# Patient Record
Sex: FEMALE | Race: WHITE | URBAN - METROPOLITAN AREA
[De-identification: names, ages, dates, MRNs, and addresses within clinical notes are randomized per-mention and may not be internally consistent; named-entity substitution may affect disease eponyms.]

---

## 2021-05-18 ENCOUNTER — NURSING HOME VISIT (OUTPATIENT)
Dept: GERIATRICS | Facility: CLINIC | Age: 81
End: 2021-05-18
Payer: COMMERCIAL

## 2021-05-18 ENCOUNTER — HOSPITAL LABORATORY (OUTPATIENT)
Dept: NURSING HOME | Facility: OTHER | Age: 81
End: 2021-05-18

## 2021-05-18 VITALS
WEIGHT: 132 LBS | SYSTOLIC BLOOD PRESSURE: 140 MMHG | TEMPERATURE: 98.7 F | OXYGEN SATURATION: 91 % | DIASTOLIC BLOOD PRESSURE: 70 MMHG | HEART RATE: 88 BPM | BODY MASS INDEX: 23.38 KG/M2

## 2021-05-18 DIAGNOSIS — Z71.89 ACP (ADVANCE CARE PLANNING): ICD-10-CM

## 2021-05-18 DIAGNOSIS — F03.91 DEMENTIA WITH BEHAVIORAL DISTURBANCE, UNSPECIFIED DEMENTIA TYPE: ICD-10-CM

## 2021-05-18 DIAGNOSIS — I82.622 ACUTE DEEP VEIN THROMBOSIS (DVT) OF LEFT UPPER EXTREMITY, UNSPECIFIED VEIN (H): ICD-10-CM

## 2021-05-18 DIAGNOSIS — F22 PARANOID BEHAVIOR (H): ICD-10-CM

## 2021-05-18 DIAGNOSIS — R62.7 ADULT FAILURE TO THRIVE: ICD-10-CM

## 2021-05-18 DIAGNOSIS — Z96.649 S/P HIP HEMIARTHROPLASTY: ICD-10-CM

## 2021-05-18 DIAGNOSIS — E11.9 TYPE 2 DIABETES, HBA1C GOAL < 8% (H): ICD-10-CM

## 2021-05-18 DIAGNOSIS — Z47.89 ORTHOPEDIC AFTERCARE: Primary | ICD-10-CM

## 2021-05-18 PROCEDURE — 99310 SBSQ NF CARE HIGH MDM 45: CPT | Performed by: NURSE PRACTITIONER

## 2021-05-18 RX ORDER — SENNOSIDES A AND B 8.6 MG/1
1 TABLET, FILM COATED ORAL 2 TIMES DAILY
COMMUNITY
End: 2021-05-25

## 2021-05-18 RX ORDER — ACETAMINOPHEN 500 MG
1000 TABLET ORAL 3 TIMES DAILY
COMMUNITY

## 2021-05-18 RX ORDER — MORPHINE SULFATE 100 MG/5ML
5 SOLUTION ORAL EVERY 4 HOURS PRN
COMMUNITY
End: 2021-05-27

## 2021-05-18 NOTE — PROGRESS NOTES
[facility]  PRIMARY CARE PROVIDER AND CLINIC:  Chantelle Villalobos MD, [address] [place] [address] [id number] / [place] [address]  Chief Complaint   Patient presents with     Hospital F/U     [facility] Medical Record Number:  [id number]  Place of Service where encounter took place:  [facility]    HPI:    Ryanne Samuels  is a 81 year old  (1940), admitted to the above facility from  [facility]. Hospital stay 5/3 through 5/14..  Admitted to this facility for  Nursing care - and rehab if she improves or hospice if she declines.   Brief Summary of Hospital Course: Ryanne had a mechanical fall - s/p L hemiarthoplasty on 5/3 with subsequent delirium/dementia and FTT appearance.   Other complications: DVT r/t PICC - now on eliquis and on imaginig noted some abnormalities of left hepatic lobe, could represent cyst vs. Hemangioma,   Updates on Status Since Skilled nursing Admission: pt is not eating/drinking much -   Today c/o feeling uncomfortable with her pants on.     CODE STATUS/ADVANCE DIRECTIVES DISCUSSION:   DNR / DNI  Patient[place] living condition: lived in [place], but now Skilled Nursing Facility.   ALLERGIES: No known drug allergies and Penicillins  PAST MEDICAL HISTORY:  has a past medical history of Acute cholecystitis, Iron deficiency anemia, unspecified, Pyelonephritis, unspecified, and Type II or unspecified type diabetes mellitus without mention of complication, not stated as uncontrolled.  PAST SURGICAL HISTORY:   has a past surgical history that includes surgical history of - .  FAMILY HISTORY: family history includes Alcohol/Drug in her sister; Diabetes in her mother and sister.  SOCIAL HISTORY:   reports that she quit smoking about 36 years ago. She has never used smokeless tobacco. She reports that she does not drink alcohol or use drugs.    Post Discharge Medication Reconciliation Status: discharge medications reconciled, continue medications without change    Current Outpatient Medications    Medication Sig Dispense Refill     acetaminophen (TYLENOL) 500 MG tablet Take 1,000 mg by mouth 3 times daily       apixaban ANTICOAGULANT (ELIQUIS) 5 MG tablet Take 5 mg by mouth 2 times daily       morphine sulfate, high concentrate, (ROXANOL-CONCENTRATED) 20 MG/ML concentrated solution Take 5 mg by mouth every 4 hours as needed       senna (SENOKOT) 8.6 MG tablet Take 1 tablet by mouth 2 times daily       ASPIRIN 81 MG OR TABS 1 TABLET DAILY       divalproex (DEPAKOTE SPRINKLE) 125 MG capsule Take 1 capsule by mouth 2 times daily. 180 capsule 0     mirtazapine (REMERON) 15 MG tablet Take 1 tablet by mouth At Bedtime. 90 tablet 0     Skin Protectants, Misc. (EUCERIN) cream Apply  topically as needed for dry skin. 454 g 0       ROS:  Limited secondary to cognitive impairment but today pt reports no pain, but just not right.     Vitals:  BP (!) 140/70   Pulse 88   Temp 98.7  F (37.1  C)   Wt 59.9 kg (132 lb)   SpO2 91%   BMI 23.38 kg/m    Exam:  GENERAL APPEARANCE:  Alert, in no distress  RESP:  respiratory effort and palpation of chest normal, auscultation of lungs diminished but clear , no respiratory distress  CV:  Palpation and auscultation of heart done , rate and rhythm reg, no murmur, no peripheral edema  ABDOMEN:  normal bowel sounds, soft, nontender, no hepatosplenomegaly or other masses  M/S:   Gait and station WBAT, but w/c/bed ridden most of the time given FTT, Digits and nails intact  SKIN:  Inspection and Palpation of skin and subcutaneous tissue healing L hip incision - eccymosis on L arm o/w intact  NEURO: 2-12 grossly intact  PSYCH:  insight and judgement, memory poor - baseline , affect and mood Pleasant     Lab/Diagnostic data:  Recent labs in [facility] reviewed by me today.          ASSESSMENT/PLAN:  Orthopedic aftercare and S/P hip hemiarthroplasty  Will scheduled Tylenol - cont eliquis for DVT prevention (treatment of actual DVT)    Acute deep vein thrombosis (DVT) of left upper extremity,  unspecified vein (H)  Cont eliquis but hold aSA x 2 days given elevated dose.     Adult failure to thrive and Dementia with behavioral disturbance, unspecified dementia type (H) and ACP (advance care planning) and Paranoid behavior (H)  Goals now are more palliative, but encouraging - will encourage pt to eat/drink, and see how she does - if improves - will trial PT/OT  If declines, will add more supportive cares. - discussed with dgt.     Type 2 diabetes  Not eating much - will hold DMII meds and check accuchecks - goal ~ 100 - 250.     Orders    DNR/DNI    discontinue amaryl, cranberry tab, donepezil, vit D, lipitor, lasix, Januvia, imodium    Hold ASA 5/19 and 5/20    Tylenol 1000 mg po TID     Change senna to 1 tab po BID - hold if loose stools (from 2 am)    Called and spoke with dgt - agrre with POC  - if she remains calm - can trial decrease in depakote next visit.     Total time spent with patient visit at the skilled nursing facility was 40 min including patient visit, review of past records and phone call to patient contact. Greater than 50% of total time spent with counseling and coordinating care due to medication changes as above - plans of care/goals of care .     Electronically signed by:  NICOLAS Ogden CNP

## 2021-05-18 NOTE — LETTER
5/18/2021        RE: Ryanne Samuels  [address]  [place] [address]        [facility]  PRIMARY CARE PROVIDER AND CLINIC:  Chantelle Villalobos MD, [address] [place] [address] / [place] [address]  Chief Complaint   Patient presents with     Hospital F/U     [facility] Medical Record Number:  [id number]  Place of Service where encounter took place:  [facility]    HPI:    Ryanne Samuels  is a 81 year old  (1940), admitted to the above facility from  [facility]. Hospital stay 5/3 through 5/14..  Admitted to this facility for  Nursing care - and rehab if she improves or hospice if she declines.   Brief Summary of Hospital Course: Ryanne had a mechanical fall - s/p L hemiarthoplasty on 5/3 with subsequent delirium/dementia and FTT appearance.   Other complications: DVT r/t PICC - now on eliquis and on imaginig noted some abnormalities of left hepatic lobe, could represent cyst vs. Hemangioma,   Updates on Status Since Skilled nursing Admission: pt is not eating/drinking much -   Today c/o feeling uncomfortable with her pants on.     CODE STATUS/ADVANCE DIRECTIVES DISCUSSION:   DNR / DNI  Patient's living condition: lived in [place], but now Skilled Nursing Facility.   ALLERGIES: No known drug allergies and Penicillins  PAST MEDICAL HISTORY:  has a past medical history of Acute cholecystitis, Iron deficiency anemia, unspecified, Pyelonephritis, unspecified, and Type II or unspecified type diabetes mellitus without mention of complication, not stated as uncontrolled.  PAST SURGICAL HISTORY:   has a past surgical history that includes surgical history of - .  FAMILY HISTORY: family history includes Alcohol/Drug in her sister; Diabetes in her mother and sister.  SOCIAL HISTORY:   reports that she quit smoking about 36 years ago. She has never used smokeless tobacco. She reports that she does not drink alcohol or use drugs.    Post Discharge Medication Reconciliation Status: discharge medications  reconciled, continue medications without change    Current Outpatient Medications   Medication Sig Dispense Refill     acetaminophen (TYLENOL) 500 MG tablet Take 1,000 mg by mouth 3 times daily       apixaban ANTICOAGULANT (ELIQUIS) 5 MG tablet Take 5 mg by mouth 2 times daily       morphine sulfate, high concentrate, (ROXANOL-CONCENTRATED) 20 MG/ML concentrated solution Take 5 mg by mouth every 4 hours as needed       senna (SENOKOT) 8.6 MG tablet Take 1 tablet by mouth 2 times daily       ASPIRIN 81 MG OR TABS 1 TABLET DAILY       divalproex (DEPAKOTE SPRINKLE) 125 MG capsule Take 1 capsule by mouth 2 times daily. 180 capsule 0     mirtazapine (REMERON) 15 MG tablet Take 1 tablet by mouth At Bedtime. 90 tablet 0     Skin Protectants, Misc. (EUCERIN) cream Apply  topically as needed for dry skin. 454 g 0       ROS:  Limited secondary to cognitive impairment but today pt reports no pain, but just not right.     Vitals:  BP (!) 140/70   Pulse 88   Temp 98.7  F (37.1  C)   Wt 59.9 kg (132 lb)   SpO2 91%   BMI 23.38 kg/m    Exam:  GENERAL APPEARANCE:  Alert, in no distress  RESP:  respiratory effort and palpation of chest normal, auscultation of lungs diminished but clear , no respiratory distress  CV:  Palpation and auscultation of heart done , rate and rhythm reg, no murmur, no peripheral edema  ABDOMEN:  normal bowel sounds, soft, nontender, no hepatosplenomegaly or other masses  M/S:   Gait and station WBAT, but w/c/bed ridden most of the time given FTT, Digits and nails intact  SKIN:  Inspection and Palpation of skin and subcutaneous tissue healing L hip incision - eccymosis on L arm o/w intact  NEURO: 2-12 grossly intact  PSYCH:  insight and judgement, memory poor - baseline , affect and mood Pleasant     Lab/Diagnostic data:  Recent labs in [facility] reviewed by me today.          ASSESSMENT/PLAN:  Orthopedic aftercare and S/P hip hemiarthroplasty  Will scheduled Tylenol - cont eliquis for DVT prevention  (treatment of actual DVT)    Acute deep vein thrombosis (DVT) of left upper extremity, unspecified vein (H)  Cont eliquis but hold aSA x 2 days given elevated dose.     Adult failure to thrive and Dementia with behavioral disturbance, unspecified dementia type (H) and ACP (advance care planning) and Paranoid behavior (H)  Goals now are more palliative, but encouraging - will encourage pt to eat/drink, and see how she does - if improves - will trial PT/OT  If declines, will add more supportive cares. - discussed with dgt.     Type 2 diabetes  Not eating much - will hold DMII meds and check accuchecks - goal ~ 100 - 250.     Orders    DNR/DNI    discontinue amaryl, cranberry tab, donepezil, vit D, lipitor, lasix, Januvia, imodium    Hold ASA 5/19 and 5/20    Tylenol 1000 mg po TID     Change senna to 1 tab po BID - hold if loose stools (from 2 am)    Called and spoke with dgt - agrre with POC  - if she remains calm - can trial decrease in depakote next visit.     Total time spent with patient visit at the skilled nursing facility was 40 min including patient visit, review of past records and phone call to patient contact. Greater than 50% of total time spent with counseling and coordinating care due to medication changes as above - plans of care/goals of care .     Electronically signed by:  NICOLAS Ogden CNP                         Sincerely,        NICOLAS Ogden CNP

## 2021-05-19 LAB
GAMMA INTERFERON BACKGROUND BLD IA-ACNC: 0.05 IU/ML
M TB IFN-G CD4+ BCKGRND COR BLD-ACNC: 9.95 IU/ML
M TB TUBERC IFN-G BLD QL: NEGATIVE
MITOGEN IGNF BCKGRD COR BLD-ACNC: 0.2 IU/ML
MITOGEN IGNF BCKGRD COR BLD-ACNC: 0.24 IU/ML

## 2021-05-20 ENCOUNTER — TELEPHONE (OUTPATIENT)
Dept: GERIATRICS | Facility: CLINIC | Age: 81
End: 2021-05-20

## 2021-05-20 DIAGNOSIS — F03.91 DEMENTIA WITH BEHAVIORAL DISTURBANCE, UNSPECIFIED DEMENTIA TYPE: Primary | ICD-10-CM

## 2021-05-20 RX ORDER — GABAPENTIN 250 MG/5ML
125 SOLUTION ORAL 3 TIMES DAILY PRN
Qty: 470 ML | Refills: 0
Start: 2021-05-20

## 2021-05-20 NOTE — TELEPHONE ENCOUNTER
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   Electronically signed by:   NICOLAS Ogden CNP

## 2021-05-25 ENCOUNTER — NURSING HOME VISIT (OUTPATIENT)
Dept: GERIATRICS | Facility: CLINIC | Age: 81
End: 2021-05-25
Payer: COMMERCIAL

## 2021-05-25 VITALS
OXYGEN SATURATION: 92 % | TEMPERATURE: 98 F | BODY MASS INDEX: 23.38 KG/M2 | WEIGHT: 132 LBS | HEART RATE: 84 BPM | SYSTOLIC BLOOD PRESSURE: 90 MMHG | RESPIRATION RATE: 16 BRPM | DIASTOLIC BLOOD PRESSURE: 73 MMHG

## 2021-05-25 DIAGNOSIS — R62.7 ADULT FAILURE TO THRIVE: ICD-10-CM

## 2021-05-25 DIAGNOSIS — Z96.649 S/P HIP HEMIARTHROPLASTY: ICD-10-CM

## 2021-05-25 DIAGNOSIS — F03.91 DEMENTIA WITH BEHAVIORAL DISTURBANCE, UNSPECIFIED DEMENTIA TYPE: Primary | ICD-10-CM

## 2021-05-25 DIAGNOSIS — Z47.89 ORTHOPEDIC AFTERCARE: ICD-10-CM

## 2021-05-25 DIAGNOSIS — I82.622 ACUTE DEEP VEIN THROMBOSIS (DVT) OF LEFT UPPER EXTREMITY, UNSPECIFIED VEIN (H): ICD-10-CM

## 2021-05-25 DIAGNOSIS — E11.9 TYPE 2 DIABETES, HBA1C GOAL < 8% (H): ICD-10-CM

## 2021-05-25 PROCEDURE — 99309 SBSQ NF CARE MODERATE MDM 30: CPT | Performed by: NURSE PRACTITIONER

## 2021-05-25 RX ORDER — LORAZEPAM 2 MG/ML
1 CONCENTRATE ORAL EVERY 6 HOURS PRN
Qty: 30 ML | Refills: 0 | Status: SHIPPED | OUTPATIENT
Start: 2021-05-25

## 2021-05-25 NOTE — LETTER
5/25/2021        RE: Ryanne Samuels  [address]  [place] [address]        [facility]    Chief Complaint   Patient presents with     Nursing Home Acute     HPI:    Ryanne Samuels is a 81 year old  (1940), who is being seen today for an episodic care visit at: [facility] () [[id number]]  Today's concern is: yelling out - waking other residents at night - not sleeping - won't take any pills- will take morphine with minimal impact.    Ryanne is a new LTC resident s/p [facility] 5/3 - 5/14 after a mechanical fall - s/p L hemiarthoplasty on 5/3 with subsequent delirium/dementia and FTT appearance.   Other complications: DVT r/t PICC - now on eliquis and on imaginig noted some abnormalities of left hepatic lobe, could represent cyst vs. Hemangioma,   Since Skilled Nursing Facility admission:  - continues FTT and not eating/drinking much - not taking medications. Goals of care are comfort but supportive - Dgt wanted to give her the best options to see if she would perk up.   Medication changes:  5/18: discontinue amaryl, cranberry tab, donepezil, vit D, lipitor, lasix, Januvia, imodium, scheduled Tylenol,   5/20 - FELIX PRN for anxiety - but pt not willing to take it.     She was awake all night last night,   Now sleeping well and therefore I didn't wake her up.     PMH/PSH reviewed in [facility] today.  REVIEW OF SYSTEMS:  Unobtainable secondary to cognitive impairment.     EXAM:  BP 90/73   Pulse 84   Temp 98  F (36.7  C)   Resp 16   Wt 59.9 kg (132 lb)   SpO2 92%   BMI 23.38 kg/m    GENERAL APPEARANCE: resting comfortable  nonlabored breathing,   No edema     No labs given goals of care.     Assessment/Plan:     Dementia with behavioral disturbance, unspecified dementia type (H)  Adult failure to thrive  Type 2 diabetes, HbA1C goal < 8% (H)  Orthopedic aftercare  S/P hip hemiarthroplasty  Acute deep vein thrombosis (DVT) of left upper extremity, unspecified vein (H)     Pt  uncomfortable - yelling out - talked to nsg - pt unwilling/able to take pills/meds to help (even liquid FELIX)   - will try concentrate ativan and topical ABH cream    Noted accuchecks stable - can stop       Orders:    discontinue ASA, Eliquis, depakote sprinkles, senna and accuchecks    Start ativan 2 mg /ml give 0.5 mg po QID PRN x 14 d.     Start ABH cream 0.25 mg = 1  Click - give 1 click transderm q 1 hr prn x 14 d  o Ativan 1 mg/ml  o Benadryl 25 mg/ml  o Haldol 1 mg/ml      Talked to dgt Mickie and she agrees with plan of care   Isn't ready to go complete hospice yet - but goals of care are comfort.   Talked to pharm D to coordinate ABH cream.   Talked to nsg to coordinate medications  Total time 30 min with > 50% coordinate care with above.    Electronically signed by:  NICOLAS Ogden CNP            Sincerely,        NICOLAS Ogden CNP

## 2021-05-25 NOTE — PROGRESS NOTES
[facility]    Chief Complaint   Patient presents with     Nursing Home Acute     HPI:    Ryanne Samuels is a 81 year old  (1940), who is being seen today for an episodic care visit at: [facility] () [[id number]]  Today's concern is: yelling out - waking other residents at night - not sleeping - won't take any pills- will take morphine with minimal impact.    Ryanne is a new LTC resident s/p [facility] 5/3 - 5/14 after a mechanical fall - s/p L hemiarthoplasty on 5/3 with subsequent delirium/dementia and FTT appearance.   Other complications: DVT r/t PICC - now on eliquis and on imaginig noted some abnormalities of left hepatic lobe, could represent cyst vs. Hemangioma,   Since Skilled Nursing Facility admission:  - continues FTT and not eating/drinking much - not taking medications. Goals of care are comfort but supportive - Dgt wanted to give her the best options to see if she would perk up.   Medication changes:  5/18: discontinue amaryl, cranberry tab, donepezil, vit D, lipitor, lasix, Januvia, imodium, scheduled Tylenol,   5/20 - FELIX PRN for anxiety - but pt not willing to take it.     She was awake all night last night,   Now sleeping well and therefore I didn't wake her up.     PMH/PSH reviewed in [facility] today.  REVIEW OF SYSTEMS:  Unobtainable secondary to cognitive impairment.     EXAM:  BP 90/73   Pulse 84   Temp 98  F (36.7  C)   Resp 16   Wt 59.9 kg (132 lb)   SpO2 92%   BMI 23.38 kg/m    GENERAL APPEARANCE: resting comfortable  nonlabored breathing,   No edema     No labs given goals of care.     Assessment/Plan:     Dementia with behavioral disturbance, unspecified dementia type (H)  Adult failure to thrive  Type 2 diabetes, HbA1C goal < 8% (H)  Orthopedic aftercare  S/P hip hemiarthroplasty  Acute deep vein thrombosis (DVT) of left upper extremity, unspecified vein (H)     Pt uncomfortable - yelling out - talked to nsg - pt unwilling/able to take  pills/meds to help (even liquid FELIX)   - will try concentrate ativan and topical ABH cream    Noted accuchecks stable - can stop       Orders:    discontinue ASA, Eliquis, depakote sprinkles, senna and accuchecks    Start ativan 2 mg /ml give 0.5 mg po QID PRN x 14 d.     Start ABH cream 0.25 mg = 1  Click - give 1 click transderm q 1 hr prn x 14 d  o Ativan 1 mg/ml  o Benadryl 25 mg/ml  o Haldol 1 mg/ml      Talked to dgt Mickie and she agrees with plan of care   Isn't ready to go complete hospice yet - but goals of care are comfort.   Talked to pharm D to coordinate ABH cream.   Talked to nsg to coordinate medications  Total time 30 min with > 50% coordinate care with above.    Electronically signed by:  NICOLAS Ogden CNP

## 2021-05-27 ENCOUNTER — TELEPHONE (OUTPATIENT)
Dept: GERIATRICS | Facility: CLINIC | Age: 81
End: 2021-05-27

## 2021-05-27 DIAGNOSIS — Z96.649 S/P HIP HEMIARTHROPLASTY: Primary | ICD-10-CM

## 2021-05-27 RX ORDER — MORPHINE SULFATE 100 MG/5ML
5 SOLUTION ORAL EVERY 4 HOURS PRN
Qty: 15 ML | Refills: 0 | Status: SHIPPED | OUTPATIENT
Start: 2021-05-27

## 2021-05-27 NOTE — TELEPHONE ENCOUNTER
Central Prior Authorization Team   Phone: [phone]      PA Initiation    Medication: Lorazepam (Ativan) 2 MG/ML - INITIATED  Insurance Company: [employer] [place] - Phone [phone] Fax [phone]  Pharmacy Filling the Rx: [facility] - [place], [place] - [address]  Filling Pharmacy Phone: [phone]  Filling Pharmacy Fax: [phone]  Start Date: 5/27/2021

## 2021-05-27 NOTE — TELEPHONE ENCOUNTER
Prior Authorization Retail Medication Request    Medication/Dose: Lorazepam (Ativan) 2 MG/ML  ICD code (if different than what is on RX): F03.91 / R62.7  Rationale: Take 0.5 mL (1 mg) PO Q6H PRN for anxiety    Insurance Name: Medicare  Insurance ID: [id number]  Secondary Insurance Name: [place] Medicaid  Secondary Insurance ID: [id number]    Pharmacy Information (if different than what is on RX)  Name: [facility]  Phone: [phone]  Fax: [phone]

## 2021-05-27 NOTE — TELEPHONE ENCOUNTER
Central Prior Authorization Team   Phone: [phone]      PRIOR AUTHORIZATION DENIED    Medication: Lorazepam (Ativan) 2 MG/ML - DENIED    Denial Date: 5/27/2021    Denial Rational:       Appeal Information: